# Patient Record
Sex: FEMALE
[De-identification: names, ages, dates, MRNs, and addresses within clinical notes are randomized per-mention and may not be internally consistent; named-entity substitution may affect disease eponyms.]

---

## 2018-12-07 NOTE — PATIENT DISCUSSION
"""S/P IOL OS: Sensar AAB00 21.5 +Omidria. Continue post operative instructions and drops per schedule.  """

## 2019-01-04 NOTE — PATIENT DISCUSSION
"""S/P IOL OU: OD: Sensar AAB00 21.0Omidria. OS: Sensar AAB00 21.5. MRx given today.  ""Continue Preservative free tears both eyes in the morning

## 2019-07-12 NOTE — PATIENT DISCUSSION
"""Type 2 Diabetic eye exam with dilation. Mild diabetic retinopathy found.  Macular edema is not ""

## 2020-10-01 ENCOUNTER — OFFICE VISIT (OUTPATIENT)
Age: 50
End: 2020-10-01

## 2021-03-01 ENCOUNTER — OFFICE VISIT (OUTPATIENT)
Age: 51
End: 2021-03-01

## 2021-06-04 NOTE — PATIENT DISCUSSION
"""Annual Type 2 Diabetic eye exam with dilation. Mild diabetic retinopathy found.  Macular edema "" Increase coumadin 6 mg on Mon and Thurs, 8 mg other days.  Recheck INR 2 weeks

## 2021-07-28 NOTE — PATIENT DISCUSSION
NPDR: No active bleeding noted today. It was explained to the patient that disease progression is common and repeat examination within 6 to 12 months to monitor for progression was advised. The patient was encouraged to continue to manage their weight, diet, exercise, blood pressure and blood glucose. The patient should continue to follow up with their primary care physician and work to optimally control their Hgb A1c.

## 2021-09-16 ENCOUNTER — OFFICE VISIT (OUTPATIENT)
Age: 51
End: 2021-09-16

## 2021-09-20 ENCOUNTER — OFFICE VISIT (OUTPATIENT)
Dept: URBAN - METROPOLITAN AREA CLINIC 9 | Facility: CLINIC | Age: 51
End: 2021-09-20

## 2021-09-21 ENCOUNTER — TELEPHONE ENCOUNTER (OUTPATIENT)
Dept: URBAN - METROPOLITAN AREA CLINIC 9 | Facility: CLINIC | Age: 51
End: 2021-09-21

## 2021-09-28 ENCOUNTER — OFFICE VISIT (OUTPATIENT)
Dept: URBAN - METROPOLITAN AREA SURGERY CENTER 9 | Facility: SURGERY CENTER | Age: 51
End: 2021-09-28

## 2021-10-14 ENCOUNTER — TELEPHONE ENCOUNTER (OUTPATIENT)
Dept: URBAN - METROPOLITAN AREA CLINIC 9 | Facility: CLINIC | Age: 51
End: 2021-10-14

## 2021-10-18 ENCOUNTER — OFFICE VISIT (OUTPATIENT)
Dept: URBAN - METROPOLITAN AREA SURGERY CENTER 9 | Facility: SURGERY CENTER | Age: 51
End: 2021-10-18

## 2021-10-19 ENCOUNTER — OFFICE VISIT (OUTPATIENT)
Dept: URBAN - METROPOLITAN AREA SURGERY CENTER 9 | Facility: SURGERY CENTER | Age: 51
End: 2021-10-19

## 2021-11-11 ENCOUNTER — OFFICE VISIT (OUTPATIENT)
Dept: URBAN - METROPOLITAN AREA CLINIC 9 | Facility: CLINIC | Age: 51
End: 2021-11-11

## 2022-02-24 ENCOUNTER — ESTABLISHED PATIENT (OUTPATIENT)
Dept: URBAN - METROPOLITAN AREA CLINIC 27 | Facility: CLINIC | Age: 52
End: 2022-02-24

## 2022-02-24 DIAGNOSIS — H52.13: ICD-10-CM

## 2022-02-24 PROCEDURE — 92015 DETERMINE REFRACTIVE STATE: CPT

## 2022-02-24 PROCEDURE — 92014 COMPRE OPH EXAM EST PT 1/>: CPT

## 2022-02-24 ASSESSMENT — VISUAL ACUITY
OS_CC: 20/20
OS_CC: 20/30
OD_CC: 20/20
OD_CC: 20/30

## 2022-02-24 ASSESSMENT — TONOMETRY
OD_IOP_MMHG: 15
OS_IOP_MMHG: 12

## 2022-07-30 ENCOUNTER — TELEPHONE ENCOUNTER (OUTPATIENT)
Age: 52
End: 2022-07-30

## 2022-07-30 RX ORDER — WHEAT DEXTRIN 3 G/4 G
1 (ONE) POWDER (GRAM) ORAL DAILY
Qty: 0 | Refills: 2 | OUTPATIENT
Start: 2021-11-11 | End: 2021-12-11

## 2022-07-30 RX ORDER — WHEAT DEXTRIN 3 G/4 G
1 (ONE) POWDER (GRAM) ORAL DAILY
Qty: 0 | Refills: 2 | OUTPATIENT
Start: 2021-09-20 | End: 2021-10-20

## 2022-07-31 ENCOUNTER — TELEPHONE ENCOUNTER (OUTPATIENT)
Age: 52
End: 2022-07-31

## 2022-07-31 RX ORDER — WHEAT DEXTRIN 3 G/4 G
1 (ONE) POWDER (GRAM) ORAL DAILY
Qty: 0 | Refills: 2 | Status: ACTIVE | COMMUNITY
Start: 2021-11-11

## 2022-07-31 RX ORDER — WHEAT DEXTRIN 3 G/4 G
1 (ONE) POWDER (GRAM) ORAL DAILY
Qty: 0 | Refills: 2 | Status: ACTIVE | COMMUNITY
Start: 2021-09-20

## 2023-04-18 ENCOUNTER — FOLLOW UP (OUTPATIENT)
Dept: URBAN - METROPOLITAN AREA CLINIC 27 | Facility: CLINIC | Age: 53
End: 2023-04-18

## 2023-04-18 DIAGNOSIS — H40.013: ICD-10-CM

## 2023-04-18 PROCEDURE — 76514 ECHO EXAM OF EYE THICKNESS: CPT

## 2023-04-18 PROCEDURE — 92083 EXTENDED VISUAL FIELD XM: CPT

## 2023-04-18 PROCEDURE — 92012 INTRM OPH EXAM EST PATIENT: CPT

## 2023-04-18 PROCEDURE — 92250 FUNDUS PHOTOGRAPHY W/I&R: CPT

## 2023-04-18 ASSESSMENT — KERATOMETRY
OD_AXISANGLE_DEGREES: 163
OD_K2POWER_DIOPTERS: 46.75
OS_AXISANGLE2_DEGREES: 94
OS_AXISANGLE_DEGREES: 4
OS_K1POWER_DIOPTERS: 44.50
OS_K2POWER_DIOPTERS: 46.75
OD_K1POWER_DIOPTERS: 44.25
OD_AXISANGLE2_DEGREES: 73

## 2023-04-18 ASSESSMENT — VISUAL ACUITY
OD_CC: 20/20
OS_CC: 20/20

## 2023-04-18 ASSESSMENT — TONOMETRY
OS_IOP_MMHG: 15
OD_IOP_MMHG: 15

## 2023-04-18 ASSESSMENT — PACHYMETRY
OS_CT_UM: 520
OD_CT_UM: 516

## 2025-01-01 NOTE — PATIENT DISCUSSION
"""Annual Type 2 Diabetic eye exam with dilation. Mild diabetic retinopathy found OS- better.  "" English